# Patient Record
Sex: MALE | Race: WHITE | NOT HISPANIC OR LATINO | URBAN - METROPOLITAN AREA
[De-identification: names, ages, dates, MRNs, and addresses within clinical notes are randomized per-mention and may not be internally consistent; named-entity substitution may affect disease eponyms.]

---

## 2017-02-23 NOTE — PATIENT DISCUSSION
(H25.13) Age-related nuclear cataract, bilateral - Assesment : Examination revealed cataract. - Plan : Monitor for changes. Advised patient of condition of cataracts and option of extraction to improve visual function. Pt wishes to wait at this time. Updated GLRx given today. Pt to call if vision changes or if becomes more bothered by visual symptoms and would like to consider extraction of cataracts. RTC in 1 year for Exam, sooner if problems or changes occur.

## 2017-03-28 ENCOUNTER — IMPORTED ENCOUNTER (OUTPATIENT)
Dept: URBAN - METROPOLITAN AREA CLINIC 1 | Facility: CLINIC | Age: 72
End: 2017-03-28

## 2017-03-28 PROBLEM — Z96.1: Noted: 2017-03-28

## 2017-03-28 PROBLEM — H04.123: Noted: 2017-03-28

## 2017-03-28 PROBLEM — H43.813: Noted: 2017-03-28

## 2017-03-28 PROBLEM — H26.493: Noted: 2017-03-28

## 2017-03-28 PROCEDURE — 92014 COMPRE OPH EXAM EST PT 1/>: CPT

## 2017-03-28 NOTE — PATIENT DISCUSSION
1.  Dry Eyes OU -The use/continuation of artificial tears were recommended. 2.  PVD OU - Patient was cautioned to call our office immediately if they experience a substantial change in their symptoms such as an increase in floaterspersistent flashes loss of visual field (may appear as a shadow or a curtain) or decrease in visual acuity as these may indicate a retinal tear or detachment. 3.  PCO OU: (Posterior Capsule Opacification)   Observe and consider YAG cap when pt feels PCO visually significant and visual acuity decreases to appropriate level. 4. Pseudophakia OU - 5. Return for an appointment in 1 year for 30 with Dr. Regino Porras.

## 2018-06-04 ENCOUNTER — IMPORTED ENCOUNTER (OUTPATIENT)
Dept: URBAN - METROPOLITAN AREA CLINIC 1 | Facility: CLINIC | Age: 73
End: 2018-06-04

## 2018-06-04 PROBLEM — H26.493: Noted: 2018-06-04

## 2018-06-04 PROBLEM — H04.123: Noted: 2018-06-04

## 2018-06-04 PROBLEM — Z96.1: Noted: 2018-06-04

## 2018-06-04 PROBLEM — H43.813: Noted: 2018-06-04

## 2018-06-04 PROCEDURE — 92014 COMPRE OPH EXAM EST PT 1/>: CPT

## 2018-06-04 PROCEDURE — 92015 DETERMINE REFRACTIVE STATE: CPT

## 2018-06-04 NOTE — PATIENT DISCUSSION
1.  PCO OU-Visually Significant secondary to glare; Pros cons risks and benefits; Schedule YAG Cap OS then OD. 2.  Pseudophakia OU - (Toric OD Standard OS) 3. Dry Eyes OU - Use ATs TID OU Routinely. 4.  PVD OU - RD precautions.  Letter to PCP Schedule YAG Cap OS then OD

## 2018-06-15 ENCOUNTER — IMPORTED ENCOUNTER (OUTPATIENT)
Dept: URBAN - METROPOLITAN AREA CLINIC 1 | Facility: CLINIC | Age: 73
End: 2018-06-15

## 2018-06-15 PROBLEM — H26.492: Noted: 2018-06-15

## 2018-06-15 PROCEDURE — 66821 AFTER CATARACT LASER SURGERY: CPT

## 2018-07-20 ENCOUNTER — IMPORTED ENCOUNTER (OUTPATIENT)
Dept: URBAN - METROPOLITAN AREA CLINIC 1 | Facility: CLINIC | Age: 73
End: 2018-07-20

## 2018-07-20 PROBLEM — H26.491: Noted: 2018-07-20

## 2018-07-20 PROCEDURE — 66821 AFTER CATARACT LASER SURGERY: CPT

## 2018-07-20 NOTE — PATIENT DISCUSSION
YAG CAP OD: (Consent signed and scanned into attachments) 1 gtt Prolensa applied. The purpose and nature of the procedure possible alternative methods of treatment the risks involved and the possibility of complications were discussed with patient. The Patient wishes to proceed and the consent was signed. The laser was then performed under topical anesthesia with no complications. Post op instructions were given to patient as well as a follow-up appointment. Patient was advised to call our office if any questions or concerns. Return for an appointment for Yag PO in 1-6 weeks with Dr. Arslan Blanco.

## 2018-09-04 ENCOUNTER — IMPORTED ENCOUNTER (OUTPATIENT)
Dept: URBAN - METROPOLITAN AREA CLINIC 1 | Facility: CLINIC | Age: 73
End: 2018-09-04

## 2018-09-04 PROCEDURE — 99024 POSTOP FOLLOW-UP VISIT: CPT

## 2018-09-04 NOTE — PATIENT DISCUSSION
1. PO YAG Cap OU: good result. MRX given. 2.  Return for an appointment for June/30 with Dr. Evon Norris.

## 2019-04-27 NOTE — PATIENT DISCUSSION
YAG CAP OS: (Consent signed and scanned into attachments) 1 gtt Prolensa applied. The purpose and nature of the procedure possible alternative methods of treatment the risks involved and the possibility of complications were discussed with patient. The Patient wishes to proceed and the consent was signed. The laser was then performed under topical anesthesia with no complications. Post op instructions were given to patient as well as a follow-up appointment. Patient was advised to call our office if any questions or concerns. Return as scheduled for Yag Cap OD with Dr. Arslan Blanco.
room air

## 2019-06-04 ENCOUNTER — IMPORTED ENCOUNTER (OUTPATIENT)
Dept: URBAN - METROPOLITAN AREA CLINIC 1 | Facility: CLINIC | Age: 74
End: 2019-06-04

## 2019-06-04 PROBLEM — H04.123: Noted: 2019-06-04

## 2019-06-04 PROBLEM — H01.004: Noted: 2019-06-04

## 2019-06-04 PROBLEM — H01.001: Noted: 2019-06-04

## 2019-06-04 PROBLEM — H43.813: Noted: 2019-06-04

## 2019-06-04 PROBLEM — Z96.1: Noted: 2019-06-04

## 2019-06-04 PROCEDURE — 92014 COMPRE OPH EXAM EST PT 1/>: CPT

## 2019-06-04 NOTE — PATIENT DISCUSSION
1.  Dry Eyes OU - Recommend ATs TID OU routinely 2. Anterior Blepharitis OU - Daily Hot compresses and lid scrubs were recommended. 3. Pseudophakia OU - (Toric OD Standard OS)  H/o YAG Cap OU 4. PVD OU - RD precautions. Patient deferred Manifest Rx today. Return for an appointment in 1 year 27 with Dr. Edison Monroe.

## 2020-06-02 ENCOUNTER — IMPORTED ENCOUNTER (OUTPATIENT)
Dept: URBAN - METROPOLITAN AREA CLINIC 1 | Facility: CLINIC | Age: 75
End: 2020-06-02

## 2020-06-02 PROBLEM — H01.004: Noted: 2020-06-02

## 2020-06-02 PROBLEM — H43.813: Noted: 2020-06-02

## 2020-06-02 PROBLEM — Z96.1: Noted: 2020-06-02

## 2020-06-02 PROBLEM — H04.123: Noted: 2020-06-02

## 2020-06-02 PROBLEM — H01.001: Noted: 2020-06-02

## 2020-06-02 PROCEDURE — 92014 COMPRE OPH EXAM EST PT 1/>: CPT

## 2020-06-02 NOTE — PATIENT DISCUSSION
1.  Dry Eyes OU - Recommend ATs BID OU routinely 2. Pseudophakia OU - (Toric OD Standard OS)  H/o YAG Cap OU 3. Anterior Blepharitis OU - Daily Hot compresses and lid scrubs were recommended. 4. PVD OU - RD precautions. Patient deferred Manifest Rx today. Return for an appointment in 1 year 27 with Dr. Jess Oliver.

## 2021-06-01 ENCOUNTER — IMPORTED ENCOUNTER (OUTPATIENT)
Dept: URBAN - METROPOLITAN AREA CLINIC 1 | Facility: CLINIC | Age: 76
End: 2021-06-01

## 2021-06-01 PROBLEM — H04.123: Noted: 2021-06-01

## 2021-06-01 PROBLEM — H01.001: Noted: 2021-06-01

## 2021-06-01 PROBLEM — H01.004: Noted: 2021-06-01

## 2021-06-01 PROCEDURE — 99214 OFFICE O/P EST MOD 30 MIN: CPT

## 2021-06-01 NOTE — PATIENT DISCUSSION
1.  Dry Eyes OU - Recommend ATs BID OU routinely 2. Pseudophakia OU - (Toric OD Standard OS)  H/o YAG Cap OU 3. Anterior Blepharitis OU - Daily Hot compresses and lid scrubs were recommended. 4. PVD OU - RD precautions. Patient deferred Manifest Rx today. Return for an appointment in 1 year 27 with Dr. Steve Sheets.

## 2022-04-02 ASSESSMENT — VISUAL ACUITY
OS_CC: J1
OS_SC: 20/20
OS_CC: J1
OD_CC: J1+
OD_CC: J1
OS_SC: 20/25
OS_GLARE: 20/60
OS_CC: J1+
OD_GLARE: 20/60
OD_SC: 20/20
OD_SC: 20/20
OD_CC: J1
OD_SC: 20/20
OD_SC: 20/25
OS_SC: 20/20
OS_SC: 20/20
OD_SC: 20/20
OD_SC: 20/20
OS_SC: 20/20
OS_SC: 20/20

## 2022-04-02 ASSESSMENT — TONOMETRY
OS_IOP_MMHG: 13
OS_IOP_MMHG: 14
OD_IOP_MMHG: 13
OD_IOP_MMHG: 14
OS_IOP_MMHG: 12
OD_IOP_MMHG: 11
OS_IOP_MMHG: 12
OS_IOP_MMHG: 12
OD_IOP_MMHG: 12
OS_IOP_MMHG: 11
OD_IOP_MMHG: 12
OD_IOP_MMHG: 12

## 2022-08-02 ENCOUNTER — ESTABLISHED PATIENT (OUTPATIENT)
Dept: URBAN - METROPOLITAN AREA CLINIC 1 | Facility: CLINIC | Age: 77
End: 2022-08-02

## 2022-08-02 DIAGNOSIS — H01.024: ICD-10-CM

## 2022-08-02 DIAGNOSIS — H01.021: ICD-10-CM

## 2022-08-02 DIAGNOSIS — H04.123: ICD-10-CM

## 2022-08-02 DIAGNOSIS — Z96.1: ICD-10-CM

## 2022-08-02 DIAGNOSIS — H43.813: ICD-10-CM

## 2022-08-02 PROCEDURE — 99214 OFFICE O/P EST MOD 30 MIN: CPT

## 2022-08-02 PROCEDURE — 92015 DETERMINE REFRACTIVE STATE: CPT

## 2022-08-02 ASSESSMENT — TONOMETRY
OS_IOP_MMHG: 12
OD_IOP_MMHG: 12

## 2022-08-02 ASSESSMENT — VISUAL ACUITY
OS_CC: 20/25+1
OS_CC: J1
OD_CC: 20/25
OD_CC: J1

## 2023-08-31 ENCOUNTER — COMPREHENSIVE EXAM (OUTPATIENT)
Dept: URBAN - METROPOLITAN AREA CLINIC 1 | Facility: CLINIC | Age: 78
End: 2023-08-31

## 2023-08-31 DIAGNOSIS — H04.123: ICD-10-CM

## 2023-08-31 DIAGNOSIS — H16.143: ICD-10-CM

## 2023-08-31 PROCEDURE — 92014 COMPRE OPH EXAM EST PT 1/>: CPT

## 2023-08-31 ASSESSMENT — VISUAL ACUITY
OS_CC: 20/25-1
OD_CC: 20/25-2
OS_CC: J1
OD_CC: J1

## 2023-08-31 ASSESSMENT — TONOMETRY
OD_IOP_MMHG: 11
OS_IOP_MMHG: 11